# Patient Record
Sex: FEMALE | Race: WHITE | Employment: FULL TIME | URBAN - NONMETROPOLITAN AREA
[De-identification: names, ages, dates, MRNs, and addresses within clinical notes are randomized per-mention and may not be internally consistent; named-entity substitution may affect disease eponyms.]

---

## 2020-11-12 ENCOUNTER — VIRTUAL VISIT (OUTPATIENT)
Dept: FAMILY MEDICINE CLINIC | Age: 60
End: 2020-11-12
Payer: COMMERCIAL

## 2020-11-12 DIAGNOSIS — Z00.00 VISIT FOR ANNUAL HEALTH EXAMINATION: ICD-10-CM

## 2020-11-12 DIAGNOSIS — R63.5 UNINTENDED WEIGHT GAIN: ICD-10-CM

## 2020-11-12 DIAGNOSIS — E55.9 VITAMIN D DEFICIENCY: ICD-10-CM

## 2020-11-12 DIAGNOSIS — N95.1 HOT FLASHES DUE TO MENOPAUSE: ICD-10-CM

## 2020-11-12 DIAGNOSIS — F41.1 GAD (GENERALIZED ANXIETY DISORDER): Primary | ICD-10-CM

## 2020-11-12 DIAGNOSIS — K52.29: ICD-10-CM

## 2020-11-12 PROCEDURE — 99443 PR PHYS/QHP TELEPHONE EVALUATION 21-30 MIN: CPT | Performed by: NURSE PRACTITIONER

## 2020-11-12 RX ORDER — PAROXETINE HYDROCHLORIDE 20 MG/1
1 TABLET, FILM COATED ORAL DAILY
COMMUNITY
End: 2020-11-12 | Stop reason: SDUPTHER

## 2020-11-12 RX ORDER — PAROXETINE HYDROCHLORIDE 20 MG/1
20 TABLET, FILM COATED ORAL DAILY
Qty: 90 TAB | Refills: 3 | Status: SHIPPED | OUTPATIENT
Start: 2020-11-12 | End: 2021-12-09 | Stop reason: SDUPTHER

## 2020-11-12 NOTE — PROGRESS NOTES
Kerry Garcia presents today for   Chief Complaint   Patient presents with    Medication Refill       Depression Screening:  3 most recent PHQ Screens 11/12/2020   Little interest or pleasure in doing things Not at all   Feeling down, depressed, irritable, or hopeless Not at all   Total Score PHQ 2 0       Learning Assessment:  Learning Assessment 11/12/2020   PRIMARY LEARNER Patient   HIGHEST LEVEL OF EDUCATION - PRIMARY LEARNER  SOME COLLEGE   BARRIERS PRIMARY LEARNER NONE   PRIMARY LANGUAGE ENGLISH   LEARNER PREFERENCE PRIMARY READING   ANSWERED BY patient   RELATIONSHIP SELF       Health Maintenance reviewed and discussed and ordered per Provider. Health Maintenance Due   Topic Date Due    Hepatitis C Screening  1960    DTaP/Tdap/Td series (1 - Tdap) 08/01/1983    PAP AKA CERVICAL CYTOLOGY  08/01/1983    Lipid Screen  08/01/2002    Shingrix Vaccine Age 50> (1 of 2) 08/01/2012    Colorectal Cancer Screening Combo  08/01/2012    Breast Cancer Screen Mammogram  08/01/2012    Flu Vaccine (1) 09/01/2020   . Coordination of Care:  1. Have you been to the ER, urgent care clinic since your last visit? Hospitalized since your last visit? No    2. Have you seen or consulted any other health care providers outside of the [address] [place] [address] since your last visit? Include any pap smears or colon screening.  No

## 2020-11-12 NOTE — PROGRESS NOTES
I am seeing this patient today virtually using HIPAA-compliant video-conferencing technology due to the COVID-19 Pandemic. The patient has previously provided full consent to use this technology and understands the risks and benefits of proceeding. I am seeing the patient today from my office in [place], [place]. The patient is in his/her home located within [place]. Patient already made aware that this is a virtual visit with provider and that for the purposes of billing, we will submit a claim for reimbursement with your insurance company. He/She was informed that he/she will be responsible for any copays, coinsurance amounts or other amounts not covered by his/her insurance company. Patient consented and accepted terms of virtual visit. THIS VISIT WAS CONDUCTED OVER THE TELEPHONE    HPI    Cole Lou is a 64 y.o. female and presents today for Medication Refill  She reports a history of IBS versus allergic gastroenteritis versus anxiety disorder. She has seen several specialists for her IBS type symptoms but no one can figure out why she has bouts of acute abdominal pain/cramping with diarrhea/vomiting. She states that she was started on paroxetine for this and reports that it has helped to decrease the frequency of episodes. she takes no other medication. She has no other chronic diseases. Allergies    No Known Allergies     Medications    Current Outpatient Medications   Medication Sig Dispense    PARoxetine (PAXIL) 20 mg tablet Take 1 Tab by mouth daily. 90 Tab     No current facility-administered medications for this visit.          Screening  Current dx    Health Maintenance    Health Maintenance Due   Topic Date Due    Hepatitis C Screening  1960    DTaP/Tdap/Td series (1 - Tdap) 08/01/1983    PAP AKA CERVICAL CYTOLOGY  08/01/1983    Lipid Screen  08/01/2002    Shingrix Vaccine Age 50> (1 of 2) 08/01/2012    Colorectal Cancer Screening Combo  08/01/2012    Breast Cancer Screen Mammogram  08/01/2012    Flu Vaccine (1) 09/01/2020        Problem List    Patient Active Problem List    Diagnosis Date Noted    MARCY (generalized anxiety disorder) 11/12/2020    Allergic gastroenteritis 11/12/2020        Family Hx    Family History   Problem Relation Age of Onset    Cancer Father         Social Hx    Social History     Socioeconomic History    Marital status:      Spouse name: Not on file    Number of children: Not on file    Years of education: Not on file    Highest education level: Not on file   Tobacco Use    Smoking status: Never Smoker    Smokeless tobacco: Never Used   Substance and Sexual Activity    Alcohol use: Yes     Comment: few times a week    Drug use: Never        Surgical Hx    Past Surgical History:   Procedure Laterality Date    HX CHOLECYSTECTOMY      HX HYSTERECTOMY          Vitals    There were no vitals taken for this visit. Patient-Reported Vitals 11/12/2020   Patient-Reported Weight 159        ROS    Review of Systems   Constitutional: Negative for chills, fever and malaise/fatigue. HENT: Negative for congestion, ear pain, sinus pain and sore throat. Eyes: Negative for blurred vision and redness. Respiratory: Negative for cough, sputum production, shortness of breath and wheezing. Cardiovascular: Negative for chest pain, palpitations and leg swelling. Gastrointestinal: Negative for abdominal pain, constipation, diarrhea, heartburn, nausea and vomiting. Genitourinary: Negative for dysuria and hematuria. Musculoskeletal: Negative for falls, joint pain and myalgias. Skin: Negative for rash. Neurological: Negative for dizziness, seizures, weakness and headaches. Endo/Heme/Allergies: Does not bruise/bleed easily. Psychiatric/Behavioral: Negative for depression and suicidal ideas. The patient does not have insomnia.          Physical Exam    Patient is a 64y.o. year old female     Physical exam was deferred due to [address] precautions as visit was completed via telemedicine. Assessment/Plan  1. MARCY (generalized anxiety disorder)  Stable on paxil; cont current regimen; Patient to continue current medication regimen and was advised to call 911 if there are any developments of suicidal/homicidal ideation. Patient verbalized understanding.   - PARoxetine (PAXIL) 20 mg tablet; Take 1 Tab by mouth daily. Dispense: 90 Tab; Refill: 3    2. Allergic gastroenteritis  Has been on Paxil for this for some time; it's working well; cont current regimen  - PARoxetine (PAXIL) 20 mg tablet; Take 1 Tab by mouth daily. Dispense: 90 Tab; Refill: 3    3. Unintended weight gain  Will check labs; thinks it could be due to slowing down since pandemic  - METABOLIC PANEL, COMPREHENSIVE; Future  - TSH 3RD GENERATION; Future  - HEMOGLOBIN A1C WITH EAG; Future    4. Vitamin D deficiency  Concerned for deficiency; will check labs  - VITAMIN D, 25 HYDROXY; Future    5. Visit for annual health examination  Yearly appointment for patient and she is overdue for labs  - CBC W/O DIFF; Future  - METABOLIC PANEL, COMPREHENSIVE; Future  - TSH 3RD GENERATION; Future  - VITAMIN D, 25 HYDROXY; Future  - LIPID PANEL; Future  - HEMOGLOBIN A1C WITH EAG; Future    6. Hot flashes due to menopause  Cont current regimen as it's working well  - PARoxetine (PAXIL) 20 mg tablet; Take 1 Tab by mouth daily. Dispense: 90 Tab; Refill: 3         Health Maintenance Items reviewed with patient as noted. 21 minutes spent with patient/reviewing records during virtual appt discussing diagnoses, treatment options, and answering any patient questions.     Emi Torres, MSN, FNP-BC

## 2020-11-24 ENCOUNTER — HOSPITAL ENCOUNTER (OUTPATIENT)
Dept: LAB | Age: 60
Discharge: HOME | End: 2020-11-24
Payer: COMMERCIAL

## 2020-11-24 LAB
ALBUMIN SERPL-MCNC: 3.8 G/DL (ref 3.5–4.7)
ALBUMIN/GLOB SERPL: 1.3 {RATIO}
ALP SERPL-CCNC: 77 U/L (ref 38–126)
ALT SERPL-CCNC: 16 U/L (ref 3–52)
ANION GAP SERPL CALC-SCNC: 10 MMOL/L
AST SERPL W P-5'-P-CCNC: 21 U/L (ref 14–74)
BASOPHILS # BLD: 0.1 K/UL (ref 0–0.1)
BASOPHILS NFR BLD: 1 % (ref 0–2)
BILIRUB SERPL-MCNC: 0.8 MG/DL (ref 0.2–1)
BUN SERPL-MCNC: 11 MG/DL (ref 9–21)
BUN/CREAT SERPL: 11
CA-I BLD-MCNC: 8.9 MG/DL (ref 8.5–10.5)
CHLORIDE SERPL-SCNC: 105 MMOL/L (ref 94–111)
CO2 SERPL-SCNC: 25 MMOL/L (ref 21–33)
CREAT SERPL-MCNC: 1 MG/DL (ref 0.7–1.2)
EOSINOPHIL # BLD: 0.3 K/UL (ref 0–0.4)
EOSINOPHIL NFR BLD: 4 % (ref 0–5)
ERYTHROCYTE [DISTWIDTH] IN BLOOD BY AUTOMATED COUNT: 12.5 % (ref 11.6–14.5)
GLOBULIN SER CALC-MCNC: 3 G/DL
GLUCOSE SERPL-MCNC: 77 MG/DL (ref 70–110)
HCT VFR BLD AUTO: 43.1 % (ref 35–45)
HGB BLD-MCNC: 13.8 G/DL (ref 12–16)
IMM GRANULOCYTES # BLD AUTO: 0 K/UL
IMM GRANULOCYTES NFR BLD AUTO: 1 %
LYMPHOCYTES # BLD: 1.6 K/UL (ref 0.9–3.6)
LYMPHOCYTES NFR BLD: 24 % (ref 21–52)
MCH RBC QN AUTO: 29.6 PG (ref 24–34)
MCHC RBC AUTO-ENTMCNC: 32 G/DL (ref 31–37)
MCV RBC AUTO: 92.3 FL (ref 74–97)
MONOCYTES # BLD: 0.4 K/UL (ref 0.05–1.2)
MONOCYTES NFR BLD: 6 % (ref 3–10)
NEUTS SEG # BLD: 4.2 K/UL (ref 1.8–8)
NEUTS SEG NFR BLD: 64 % (ref 40–73)
PLATELET # BLD AUTO: 319 K/UL (ref 135–420)
PMV BLD AUTO: 9.3 FL (ref 9.2–11.8)
POTASSIUM SERPL-SCNC: 3.8 MMOL/L (ref 3.2–5.1)
PROT SERPL-MCNC: 6.8 G/DL (ref 6.1–8.4)
RBC # BLD AUTO: 4.67 M/UL (ref 4.2–5.3)
SODIUM SERPL-SCNC: 140 MMOL/L (ref 135–145)
TSH SERPL DL<=0.05 MIU/L-ACNC: 2.07 UIU/ML (ref 0.35–6.2)
WBC # BLD AUTO: 6.5 K/UL (ref 4.6–13.2)

## 2020-11-24 PROCEDURE — 36415 COLL VENOUS BLD VENIPUNCTURE: CPT

## 2020-11-24 PROCEDURE — 84443 ASSAY THYROID STIM HORMONE: CPT

## 2020-11-24 PROCEDURE — 83036 HEMOGLOBIN GLYCOSYLATED A1C: CPT

## 2020-11-24 PROCEDURE — 80061 LIPID PANEL: CPT

## 2020-11-24 PROCEDURE — 85025 COMPLETE CBC W/AUTO DIFF WBC: CPT

## 2020-11-24 PROCEDURE — 82306 VITAMIN D 25 HYDROXY: CPT

## 2020-11-24 PROCEDURE — 80053 COMPREHEN METABOLIC PANEL: CPT

## 2020-11-25 LAB
25(OH)D3 SERPL-MCNC: 18.5 NG/ML (ref 30–100)
CHOLEST SERPL-MCNC: 187 MG/DL
EST. AVERAGE GLUCOSE BLD GHB EST-MCNC: 100 MG/DL
HBA1C MFR BLD: 5.1 % (ref 4–5.6)
HDLC SERPL-MCNC: 104 MG/DL
HDLC SERPL: 1.8 {RATIO} (ref 0–5)
LDLC SERPL CALC-MCNC: 69 MG/DL (ref 0–100)
LIPID PROFILE,FLP: NORMAL
TRIGL SERPL-MCNC: 70 MG/DL (ref ?–150)
VLDLC SERPL CALC-MCNC: 14 MG/DL

## 2020-11-30 DIAGNOSIS — E55.9 VITAMIN D DEFICIENCY: Primary | ICD-10-CM

## 2020-11-30 RX ORDER — ERGOCALCIFEROL 1.25 MG/1
50000 CAPSULE ORAL
Qty: 12 CAP | Refills: 3 | Status: SHIPPED | OUTPATIENT
Start: 2020-11-30 | End: 2021-05-11 | Stop reason: SDUPTHER

## 2021-01-22 ENCOUNTER — OFFICE VISIT (OUTPATIENT)
Dept: FAMILY MEDICINE CLINIC | Age: 61
End: 2021-01-22
Payer: COMMERCIAL

## 2021-01-22 VITALS
OXYGEN SATURATION: 96 % | HEART RATE: 85 BPM | DIASTOLIC BLOOD PRESSURE: 72 MMHG | TEMPERATURE: 98.6 F | SYSTOLIC BLOOD PRESSURE: 108 MMHG

## 2021-01-22 DIAGNOSIS — H10.9 CONJUNCTIVITIS OF BOTH EYES, UNSPECIFIED CONJUNCTIVITIS TYPE: ICD-10-CM

## 2021-01-22 DIAGNOSIS — Z20.822 SUSPECTED COVID-19 VIRUS INFECTION: ICD-10-CM

## 2021-01-22 DIAGNOSIS — B02.9 HERPES ZOSTER WITHOUT COMPLICATION: Primary | ICD-10-CM

## 2021-01-22 PROCEDURE — 99214 OFFICE O/P EST MOD 30 MIN: CPT | Performed by: INTERNAL MEDICINE

## 2021-01-22 RX ORDER — ACYCLOVIR 800 MG/1
800 TABLET ORAL
Qty: 40 TAB | Refills: 0 | Status: SHIPPED | OUTPATIENT
Start: 2021-01-22 | End: 2021-01-30

## 2021-01-22 NOTE — PROGRESS NOTES
Viral infection, skin irritation, possible shingles, night sweats. Covid tested        Charlie Flores presents today for   Chief Complaint   Patient presents with    Shingles       Is someone accompanying this pt? No      Is the patient using any DME equipment during OV? no    Depression Screening:  3 most recent PHQ Screens 11/12/2020   Little interest or pleasure in doing things Not at all   Feeling down, depressed, irritable, or hopeless Not at all   Total Score PHQ 2 0       Learning Assessment:  Learning Assessment 11/12/2020   PRIMARY LEARNER Patient   HIGHEST LEVEL OF EDUCATION - PRIMARY LEARNER  SOME COLLEGE   BARRIERS PRIMARY LEARNER NONE   PRIMARY LANGUAGE ENGLISH   LEARNER PREFERENCE PRIMARY READING   ANSWERED BY patient   RELATIONSHIP SELF         Health Maintenance reviewed and discussed and ordered per Provider. Health Maintenance Due   Topic Date Due    Hepatitis C Screening  1960    DTaP/Tdap/Td series (1 - Tdap) 08/01/1983    PAP AKA CERVICAL CYTOLOGY  08/01/1983    Shingrix Vaccine Age 50> (1 of 2) 08/01/2012    Colorectal Cancer Screening Combo  08/01/2012    Flu Vaccine (1) 09/01/2020   . Coordination of Care:  1. Have you been to the ER, urgent care clinic since your last visit? Hospitalized since your last visit? no    2. Have you seen or consulted any other health care providers outside of the [address] [place] [address] since your last visit? Include any pap smears or colon screening.  no

## 2021-01-22 NOTE — PROGRESS NOTES
This is a very pleasant patient who was seen in clinic today for an acute visit. Assessment/Plan:      1. Herpes zoster without complication  Clearly has what appears to be zoster. They have resolved on her right chest but she now has it on her back. Alternatively this could be a viral exanthem having pain and tingling though I am leaning towards herpes zoster. I am going to check a CBC and a CMP start acyclovir 805 times daily  - acyclovir (ZOVIRAX) 800 mg tablet; Take 1 Tab by mouth five (5) times daily for 8 days. Dispense: 40 Tab; Refill: 0  - METABOLIC PANEL, COMPREHENSIVE  - CBC WITH AUTOMATED DIFF    2. Suspected COVID-19 virus infection  Has direct exposures to COVID-19 patients. Additionally she has postnasal drip she has a couple ulcers in her throat and headache some chills and what may be a viral exanthem. I am going to check her for COVID-19 and she is being quarantined. She is to report to the ER should she develop any shortness of breath or chest pain  - NOVEL CORONAVIRUS (COVID-19); Future    3. Conjunctivitis of both eyes, unspecified conjunctivitis type  Clearly has some conjunctivitis but it almost appears she has icterus also on the periphery of her eyes. I am going to check a CMP  - NOVEL CORONAVIRUS (COVID-19); Future  - METABOLIC PANEL, COMPREHENSIVE  - CBC WITH AUTOMATED DIFF       Reviewed with him that COVID-19 pandemic is an evolving situation with rapidly changing recommendations & guidelines. Medical decisions are made based on the the best information available at the time. Recommended he stay tuned for updates published by trusted sources and to advise your PCP of any unexpected changes in clinical condition       Chief Complaint   Patient presents with    Shingles    Concern For COVID-19 (Coronavirus)      Subjective:   HPI     This is a pleasant 82-year-old female who presents today complaining of postnasal drip sore throat eye redness and pain on her skin.   She noticed a Leet set of lesions on her right chest several days ago which have resolved. Unfortunately now she is noted that on her back specifically her upper back at feet feels like it is tingling and burning. It reminds her of when she had shingles. She also notes that she is just felt very tired and fatigued. She was checked for COVID-19 about a week and a half ago was negative. She is coming contact with people who have Covid. She reports some mild postnasal drip and a sore throat. She also reports a slight headache. She has no nausea vomiting or diarrhea. She denies any definitive fever but she has been having chills. Recent Travel Screening and Travel History documentation  Exam      ROS  - GEN: no weight gain/loss, no fevers + chills  - HEENT: no vision changes, no tinnitus, + sore throat  - CV: no cp, palpitations or edema  - RESP: no sob, + dry cough  - ABD: no n/v/d, no blood in stool  - : no dysuria or changes in freq. - SKIN: lesion on back, tingling/buring on her back  - Neuro: no resting tremors, parasthesia in extremities, no headaches  - MS: No weakness in extremities, no gait abnormalities  - Other    Visit Vitals  /72   Pulse 85   Temp 98.6 °F (37 °C)   SpO2 96%        Physical Exam  Constitutional:       Appearance: Normal appearance. Verónica Sharper NAD and pleasant  HENT:      Head: Normocephalic. Nose: Nose normal.      Mouth/Throat:      Mouth: Mucous membranes are moist. Throat not inflammed but two small ulcers are presernt  Eyes:      Extraocular Movements: Extraocular movements intact. Conjunctiva/sclera: Conjunctivae erythemetous ? Icterus at periphery? Pupils: Pupils are equal, round, and reactive to light. Cardiovascular:      Rate and Rhythm: Normal rate and regular rhythm. Pulses: Normal pulses. Pulmonary:      Effort: No respiratory distress. Breath sounds: CTAB and No stridor. No rhonchi. Abdominal:      General: There is no distension.  NT, ND  Neurological:      Mental Status: She is alert and oriented times 3. No resting tremor, normal gait     Cranial Nerves: cranial nerves grossly intact  SKIN: multiple areas on back with a maculopapular appearings. No open wounds. Pain noted when I rub my finger over the lesions    Current Outpatient Medications on File Prior to Visit   Medication Sig Dispense Refill    ergocalciferol (ERGOCALCIFEROL) 1,250 mcg (50,000 unit) capsule Take 1 Cap by mouth every seven (7) days. 12 Cap 3    PARoxetine (PAXIL) 20 mg tablet Take 1 Tab by mouth daily. 90 Tab 3     No current facility-administered medications on file prior to visit. Disclaimer:  Discussed expected course/resolution/complications of diagnosis in detail with patient. Medication risks/benefits/costs/interactions/alternatives discussed with patient. He was given an after visit summary which includes diagnoses, current medications, & vitals. He expressed understanding with the diagnosis and plan.

## 2021-01-24 LAB — SARS-COV-2, NAA: NOT DETECTED

## 2021-05-11 ENCOUNTER — VIRTUAL VISIT (OUTPATIENT)
Dept: FAMILY MEDICINE CLINIC | Age: 61
End: 2021-05-11
Payer: COMMERCIAL

## 2021-05-11 ENCOUNTER — TELEPHONE (OUTPATIENT)
Dept: FAMILY MEDICINE CLINIC | Age: 61
End: 2021-05-11

## 2021-05-11 DIAGNOSIS — R30.0 DYSURIA: Primary | ICD-10-CM

## 2021-05-11 DIAGNOSIS — E55.9 VITAMIN D DEFICIENCY: ICD-10-CM

## 2021-05-11 DIAGNOSIS — N30.90 CYSTITIS: ICD-10-CM

## 2021-05-11 PROCEDURE — 99213 OFFICE O/P EST LOW 20 MIN: CPT | Performed by: INTERNAL MEDICINE

## 2021-05-11 RX ORDER — ERGOCALCIFEROL 1.25 MG/1
50000 CAPSULE ORAL
Qty: 12 CAP | Refills: 3 | Status: SHIPPED | OUTPATIENT
Start: 2021-05-11 | End: 2021-12-09 | Stop reason: ALTCHOICE

## 2021-05-11 RX ORDER — CEFUROXIME AXETIL 500 MG/1
500 TABLET ORAL 2 TIMES DAILY
Qty: 14 TAB | Refills: 0 | Status: SHIPPED | OUTPATIENT
Start: 2021-05-11 | End: 2021-12-09 | Stop reason: ALTCHOICE

## 2021-05-11 NOTE — PROGRESS NOTES
Preeti Montero presents today for   Chief Complaint   Patient presents with    UTI     possible uti symptoms       Depression Screening:  3 most recent PHQ Screens 5/11/2021   Little interest or pleasure in doing things Not at all   Feeling down, depressed, irritable, or hopeless Not at all   Total Score PHQ 2 0       Learning Assessment:  Learning Assessment 11/12/2020   PRIMARY LEARNER Patient   HIGHEST LEVEL OF EDUCATION - PRIMARY LEARNER  SOME COLLEGE   BARRIERS PRIMARY LEARNER NONE   PRIMARY LANGUAGE ENGLISH   LEARNER PREFERENCE PRIMARY READING   ANSWERED BY patient   RELATIONSHIP SELF       Health Maintenance reviewed and discussed and ordered per Provider. Health Maintenance Due   Topic Date Due    Hepatitis C Screening  Never done    COVID-19 Vaccine (1) Never done    DTaP/Tdap/Td series (1 - Tdap) Never done    PAP AKA CERVICAL CYTOLOGY  Never done    Shingrix Vaccine Age 50> (1 of 2) Never done    Colorectal Cancer Screening Combo  Never done   . Coordination of Care:  1. Have you been to the ER, urgent care clinic since your last visit? Hospitalized since your last visit? no    2. Have you seen or consulted any other health care providers outside of the [address] [place] [address] since your last visit? Include any pap smears or colon screening.  No    Height-5'8

## 2021-05-11 NOTE — PROGRESS NOTES
I was at my office in [place], [place] while conducting this encounter. This virtual visit was conducted via Telephone    1. Dysuria  See below    2. Vitamin D deficiency  Renew vitamin D  - ergocalciferol (ERGOCALCIFEROL) 1,250 mcg (50,000 unit) capsule; Take 1 Cap by mouth every seven (7) days. Dispense: 12 Cap; Refill: 3    3. Cystitis  Start Ceftin 500 twice daily for 7 days  - cefUROXime (CEFTIN) 500 mg tablet; Take 1 Tab by mouth two (2) times a day. Dispense: 14 Tab; Refill: 0       Chief Complaint   Patient presents with    UTI     possible uti symptoms        HPI   This is a delightful 70-year-old female who presents today complaining of roughly 2-1/2 days of increased urinary frequency and pressure. Just prior to this she had been eating at [facility] and unfortunately it upset her stomach horrible and she had very profuse diarrhea. At that time she pretty much decided she was going to get a urinary tract infection. Over the following day or so she began to notice increased pressure. She has no fevers or chills she has no flank pain she has no nausea or vomiting. Otherwise she reports she is been doing well  Current Outpatient Medications on File Prior to Visit   Medication Sig Dispense Refill    PARoxetine (PAXIL) 20 mg tablet Take 1 Tab by mouth daily. 90 Tab 3     No current facility-administered medications on file prior to visit.          Patient Active Problem List   Diagnosis Code    MARCY (generalized anxiety disorder) F41.1    Allergic gastroenteritis K52.29        Gen: nad, pleasant  heent mmm  Cv:\" rrr  abd bs+, NT

## 2021-12-09 ENCOUNTER — OFFICE VISIT (OUTPATIENT)
Dept: INTERNAL MEDICINE CLINIC | Age: 61
End: 2021-12-09
Payer: COMMERCIAL

## 2021-12-09 VITALS
TEMPERATURE: 97 F | RESPIRATION RATE: 18 BRPM | SYSTOLIC BLOOD PRESSURE: 132 MMHG | OXYGEN SATURATION: 99 % | WEIGHT: 166.4 LBS | HEIGHT: 68 IN | HEART RATE: 65 BPM | BODY MASS INDEX: 25.22 KG/M2 | DIASTOLIC BLOOD PRESSURE: 86 MMHG

## 2021-12-09 DIAGNOSIS — Z01.419 PAP TEST, AS PART OF ROUTINE GYNECOLOGICAL EXAMINATION: ICD-10-CM

## 2021-12-09 DIAGNOSIS — K52.29: ICD-10-CM

## 2021-12-09 DIAGNOSIS — F41.1 GAD (GENERALIZED ANXIETY DISORDER): Primary | ICD-10-CM

## 2021-12-09 DIAGNOSIS — Z12.11 ENCOUNTER FOR SCREENING COLONOSCOPY: ICD-10-CM

## 2021-12-09 DIAGNOSIS — N95.1 HOT FLASHES DUE TO MENOPAUSE: ICD-10-CM

## 2021-12-09 PROCEDURE — 99213 OFFICE O/P EST LOW 20 MIN: CPT | Performed by: INTERNAL MEDICINE

## 2021-12-09 RX ORDER — PAROXETINE HYDROCHLORIDE 20 MG/1
20 TABLET, FILM COATED ORAL DAILY
Qty: 90 TABLET | Refills: 3 | Status: SHIPPED | OUTPATIENT
Start: 2021-12-09

## 2021-12-09 NOTE — PROGRESS NOTES
Manuel Pacheco presents today for   Chief Complaint   Patient presents with    Follow-up    Medication Refill       Is someone accompanying this pt? no  Is the patient using any DME equipment during OV? no    Depression Screening:  3 most recent PHQ Screens 12/9/2021   Little interest or pleasure in doing things Not at all   Feeling down, depressed, irritable, or hopeless Not at all   Total Score PHQ 2 0       Learning Assessment:  Learning Assessment 11/12/2020   PRIMARY LEARNER Patient   HIGHEST LEVEL OF EDUCATION - PRIMARY LEARNER  SOME COLLEGE   BARRIERS PRIMARY LEARNER NONE   PRIMARY LANGUAGE ENGLISH   LEARNER PREFERENCE PRIMARY READING   ANSWERED BY patient   RELATIONSHIP SELF         Health Maintenance reviewed and discussed and ordered per Provider. Health Maintenance Due   Topic Date Due    Hepatitis C Screening  Never done    DTaP/Tdap/Td series (1 - Tdap) Never done    Cervical cancer screen  Never done    Colorectal Cancer Screening Combo  Never done    Shingrix Vaccine Age 50> (1 of 2) Never done    Flu Vaccine (1) Never done   . Coordination of Care:  1. \"Have you been to the ER, urgent care clinic since your last visit? Hospitalized since your last visit? \" yes [facility] kidney stone    2. \"Have you seen or consulted any other health care providers outside of the [address] [place] [address] since your last visit? \" No     3. For patients aged 39-70: Has the patient had a colonoscopy? No     If the patient is female:    4. For patients aged 41-77: Has the patient had a mammogram within the past 2 years? Yes,  satisfied with blue hyperlink 2021    5. For patients aged 21-65: Has the patient had a pap smear?  No

## 2021-12-09 NOTE — PROGRESS NOTES
1. MARCY (generalized anxiety disorder)  But her anxiety has been well controlled. I am going to renew her Paxil  - PARoxetine (PAXIL) 20 mg tablet; Take 1 Tablet by mouth daily. Dispense: 90 Tablet; Refill: 3    2. Hot flashes due to menopause  Well controlled with Paxil  - PARoxetine (PAXIL) 20 mg tablet; Take 1 Tablet by mouth daily. Dispense: 90 Tablet; Refill: 3    4. Encounter for screening colonoscopy  Opal Guerrero is due for a colonoscopy and I am referring her to GI  - REFERRAL TO GASTROENTEROLOGY    5. Pap test, as part of routine gynecological examination  The patient has asked referral to a different OB/GYN. She does have a history of uterine cancer. I making that right recommendation  - REFERRAL TO OBSTETRICS AND GYNECOLOGY    Results for Tim Leigh (MRN [id number]) as of 12/9/2021 16:07   Ref. Range 11/24/2020 08:48   Sodium Latest Ref Range: 135 - 145 mmol/L 140   Potassium Latest Ref Range: 3.2 - 5.1 mmol/L 3.8   Chloride Latest Ref Range: 94 - 111 mmol/L 105   CO2 Latest Ref Range: 21 - 33 mmol/L 25   Anion gap Latest Units: mmol/L 10   Glucose Latest Ref Range: 70 - 110 mg/dL 77   BUN Latest Ref Range: 9 - 21 mg/dL 11   Creatinine Latest Ref Range: 0.70 - 1.20 mg/dL 1.00   BUN/Creatinine ratio Latest Units:   11   Calcium Latest Ref Range: 8.5 - 10.5 mg/dL 8.9   GFR est non-AA Latest Units: ml/min/1.73m2 57   GFR est AA Latest Units: ml/min/1.73m2 >60   Bilirubin, total Latest Ref Range: 0.2 - 1.0 mg/dL 0.8   Protein, total Latest Ref Range: 6.1 - 8.4 g/dL 6.8   Albumin Latest Ref Range: 3.5 - 4.7 g/dL 3.8   Globulin Latest Units: g/dL 3.0   A-G Ratio Latest Units:   1.3   ALT Latest Ref Range: 3 - 52 U/L 16   AST Latest Ref Range: 14 - 74 U/L 21   Alk.  phosphatase Latest Ref Range: 38 - 126 U/L 77   Triglyceride Latest Ref Range: <150 mg/dL 70   Cholesterol, total Latest Ref Range: <200 mg/dL 187   HDL Cholesterol Latest Units: mg/dL 104   CHOL/HDL Ratio Latest Ref Range: 0.0 - 5.0   1.8 VLDL, calculated Latest Units: mg/dL 14   LDL, calculated Latest Ref Range: 0 - 100 mg/dL 69   Hemoglobin A1c, (calculated) Latest Ref Range: 4.0 - 5.6 % 5.1   Est. average glucose Latest Units: mg/dL 100     Chief Complaint   Patient presents with    Follow-up    Medication Refill        HPI   This is a delightful 31-year-old female with a history of hot flashes and generalized anxiety disorder who presents for follow-up. She reports she was at [facility] due to an acute kidney stone but subsequently improved. I have reviewed her labs from September. She reports overall she is doing very well. She reports her anxiety while still there is relatively under control. Her hot flashes are also at baseline. She has no other complaints today  Patient Active Problem List   Diagnosis Code    MARCY (generalized anxiety disorder) F41.1    Allergic gastroenteritis K52.29        No current outpatient medications on file prior to visit. No current facility-administered medications on file prior to visit. ROS  - GEN: no weight gain/loss, no fevers or chills  - HEENT: no vision changes, no tinnitus, no sore throat  - CV: no cp, palpitations or edema  - RESP: no sob, cough  -   Visit Vitals  /86   Pulse 65   Temp 97 °F (36.1 °C)   Resp 18   Ht 5' 8\" (1.727 m)   Wt 166 lb 6.4 oz (75.5 kg)   SpO2 99%   BMI 25.30 kg/m²           Physical Exam  Constitutional:       Appearance: Normal appearance. Normal weight. NAD and pleasant  HENT:      Head: Normocephalic. Nose: Nose normal.      Mouth/Throat:     Eyes:      Extraocular Movements: Extraocular movements intact. Conjunctiva/sclera: Conjunctivae normal. Sclera anicteric     Cardiovascular:      Rate and Rhythm: Normal rate and regular rhythm. Pulses: Normal pulses. Pulmonary:      Effort: No respiratory distress. Breath sounds: CTAB and No stridor. No rhonchi.

## 2022-03-19 PROBLEM — K52.29 ALLERGIC GASTROENTERITIS: Status: ACTIVE | Noted: 2020-11-12

## 2022-03-20 PROBLEM — F41.1 GAD (GENERALIZED ANXIETY DISORDER): Status: ACTIVE | Noted: 2020-11-12

## 2022-11-21 ENCOUNTER — TELEPHONE (OUTPATIENT)
Dept: FAMILY MEDICINE CLINIC | Age: 62
End: 2022-11-21

## 2022-11-21 NOTE — TELEPHONE ENCOUNTER
Referred to Donovan Walker. Krista Gaytan NP  ----- Message from Maggie Magallon sent at 11/21/2022  4:03 PM EST -----  Subject: Appointment Request    Reason for Call: New Patient/New to Provider Appointment needed: New   Patient Request Appointment    QUESTIONS    Reason for appointment request? No appointments available during search     Additional Information for Provider? Pt needs a new pcp. Was Dr Chaim Terrell   pt. Pleased contact the pt with an appt time to be seen.   ---------------------------------------------------------------------------  --------------  Contreras Cruz IIPT  [phone]; OK to leave message on voicemail  ---------------------------------------------------------------------------  --------------  SCRIPT ANSWERS  GERID Screen: Elliot Otto

## 2022-12-01 ENCOUNTER — OFFICE VISIT (OUTPATIENT)
Dept: FAMILY MEDICINE CLINIC | Age: 62
End: 2022-12-01
Payer: COMMERCIAL

## 2022-12-01 VITALS
SYSTOLIC BLOOD PRESSURE: 121 MMHG | DIASTOLIC BLOOD PRESSURE: 83 MMHG | OXYGEN SATURATION: 95 % | HEART RATE: 74 BPM | TEMPERATURE: 97.4 F | RESPIRATION RATE: 16 BRPM | BODY MASS INDEX: 25.91 KG/M2 | WEIGHT: 170.4 LBS

## 2022-12-01 DIAGNOSIS — N95.1 HOT FLASHES DUE TO MENOPAUSE: ICD-10-CM

## 2022-12-01 DIAGNOSIS — F41.1 GAD (GENERALIZED ANXIETY DISORDER): ICD-10-CM

## 2022-12-01 DIAGNOSIS — R53.83 FATIGUE, UNSPECIFIED TYPE: ICD-10-CM

## 2022-12-01 DIAGNOSIS — K52.29: ICD-10-CM

## 2022-12-01 DIAGNOSIS — Z12.31 ENCOUNTER FOR SCREENING MAMMOGRAM FOR MALIGNANT NEOPLASM OF BREAST: ICD-10-CM

## 2022-12-01 DIAGNOSIS — Z13.21 ENCOUNTER FOR VITAMIN DEFICIENCY SCREENING: Primary | ICD-10-CM

## 2022-12-01 RX ORDER — PAROXETINE HYDROCHLORIDE 20 MG/1
20 TABLET, FILM COATED ORAL DAILY
Qty: 90 TABLET | Refills: 3 | Status: SHIPPED | OUTPATIENT
Start: 2022-12-01

## 2022-12-01 NOTE — PROGRESS NOTES
Lulyjohn Coe presents today for   Chief Complaint   Patient presents with    Establish Care     Establish care with provider has not seen a dr in a while and needs some overall blood work       Is someone accompanying this pt? No     Is the patient using any DME equipment during OV? No     Depression Screening:  3 most recent PHQ Screens 12/1/2022   Little interest or pleasure in doing things Not at all   Feeling down, depressed, irritable, or hopeless Not at all   Total Score PHQ 2 0       Learning Assessment:  Learning Assessment 11/12/2020   PRIMARY LEARNER Patient   HIGHEST LEVEL OF EDUCATION - PRIMARY LEARNER  SOME COLLEGE   BARRIERS PRIMARY LEARNER NONE   PRIMARY LANGUAGE ENGLISH   LEARNER PREFERENCE PRIMARY READING   ANSWERED BY patient   RELATIONSHIP SELF       Fall Risk  Fall Risk Assessment, last 12 mths 11/12/2020   Able to walk? Yes   Fall in past 12 months? No       ADL  ADL Assessment 12/1/2022   Feeding yourself No Help Needed   Getting from bed to chair No Help Needed   Getting dressed No Help Needed   Bathing or showering No Help Needed   Walk across the room (includes cane/walker) No Help Needed   Using the telphone No Help Needed   Taking your medications No Help Needed   Preparing meals No Help Needed   Managing money (expenses/bills) No Help Needed   Moderately strenuous housework (laundry) No Help Needed   Shopping for personal items (toiletries/medicines) No Help Needed   Shopping for groceries No Help Needed   Driving No Help Needed   Climbing a flight of stairs No Help Needed   Getting to places beyond walking distances No Help Needed       Travel Screening:    Travel Screening       Question Response    In the last 10 days, have you been in contact with someone who was confirmed or suspected to have Coronavirus/COVID-19? No / Unsure    Have you had a COVID-19 viral test in the last 10 days? No    Do you have any of the following new or worsening symptoms?  None of these    Have you traveled internationally or domestically in the last month? No          Travel History   Travel since 11/01/22    No documented travel since 11/01/22         Health Maintenance reviewed and discussed and ordered per Provider. Health Maintenance Due   Topic Date Due    Hepatitis C Screening  Never done    DTaP/Tdap/Td series (1 - Tdap) Never done    Colorectal Cancer Screening Combo  Never done    Shingrix Vaccine Age 50> (1 of 2) Never done    COVID-19 Vaccine (3 - Booster for Moderna series) 09/01/2021    Flu Vaccine (1) Never done    Depression Screen  12/09/2022   . Coordination of Care:  1. \"Have you been to the ER, urgent care clinic since your last visit? Hospitalized since your last visit? \" No    2. \"Have you seen or consulted any other health care providers outside of the [address] [place] [address] since your last visit? \" No     3. For patients aged 39-70: Has the patient had a colonoscopy? Yes - Care Gap present. Rooming MA/LPN to request most recent results     If the patient is female:    4. For patients aged 41-77: Has the patient had a mammogram within the past 2 years? Yes - no Care Gap present    5. For patients aged 21-65: Has the patient had a pap smear?  Yes - no Care Gap present

## 2022-12-01 NOTE — PROGRESS NOTES
Sami Flores (: 1960) is a 63 y.o. female patient, here for evaluation of the following chief complaint(s):  Establish Care (Establish care with provider has not seen a dr in a while and needs some overall blood work)       ASSESSMENT/PLAN:  Below is the assessment and plan developed based on review of pertinent history, physical exam, labs, studies, and medications. I spent 20 minutes with this patient performing a medically appropriate exam, ordering tests and medications, counseling and educating, and documenting in the EMR  Labs will be ordered today, mammogram ordered today, will refill medication paroxetine. We will follow-up with patient with all lab results. 1. Encounter for vitamin deficiency screening  -     VITAMIN D, 25 HYDROXY  2. MARCY (generalized anxiety disorder)  -     PARoxetine (PAXIL) 20 mg tablet; Take 1 Tablet by mouth daily. , Normal, Disp-90 Tablet, R-3  -     CBC WITH AUTOMATED DIFF  -     METABOLIC PANEL, COMPREHENSIVE  -     LIPID PANEL  3. Allergic gastroenteritis  -     PARoxetine (PAXIL) 20 mg tablet; Take 1 Tablet by mouth daily. , Normal, Disp-90 Tablet, R-3  4. Hot flashes due to menopause  -     PARoxetine (PAXIL) 20 mg tablet; Take 1 Tablet by mouth daily. , Normal, Disp-90 Tablet, R-3  5. Encounter for screening mammogram for malignant neoplasm of breast  -     [facility] 3D LONA W MAMMO BI SCREENING INCL CAD; Future  6. Fatigue, unspecified type  -     TSH 3RD GENERATION  No results found for this visit on 22. Return if symptoms worsen or fail to improve. I have discussed the diagnosis with the patient and the intended plan as seen in the above orders. The patient has received an after-visit summary and questions were answered concerning future plans. I have discussed medication side effects and warnings with the patient as well. I have reviewed the plan of care with the patient, accepted their input and they are in agreement with the treatment goals. Previous lab and imaging results were reviewed by me. SUBJECTIVE/OBJECTIVE:    HPI: 27-year-old female presents to the clinic for refill of her medication. She has been taking the medication paroxetine for a couple of years, and she states it does seem to help with anxiety. She states she did try to reduce the dose to 10 mg and developed hot flashes so she continues to take it at 20 mg and has no hot flashes. The patient states she had a colonoscopy a few months ago and a mass was removed from the rectal area but she was told this was negative for malignancy. She has follow-up scheduled in a few months. Patient sees a gastroenterologist in [place]. The patient states she chronically has 8-10 bowel movements a day although they could be quite small. She states she does take fiber, calcium, vitamin D3, and she takes a medication called prelieve acid reducer. States she was diagnosed with cervical cancer approximately at 30years of age, she has had a total hysterectomy with radiation. The patient states she does get mammograms yearly it is almost time for her to have another mammogram she denies any history of breast cancer    Denies any illness today denying any acute symptoms such as fever, chills, sweats, abdominal pain, nausea, vomiting. ROS:  Constitutional: Negative for fever, chills ,fatigue, unexplained weight gain/loss  HENT:  Negative for ear pain,postnasal drip, rhinitis,  Eyes:  Negative for visual disturbance,   Respiratory:  Negative for cough and shortness of breath,wheezing ,congestion  Cardiovascular:  Negative for chest pain, palpitations and leg swelling. Gastrointestinal: Chronic radiation-induced diarrhea   musculoskeletal:  Negative for arthralgias, back pain and gait problem.  Joint pain, muscle pain  Skin:  Negative for rash, lesions,  Neurological:  Negative for fainting,dizziness, weakness, headaches,numbness       Current Outpatient Medications   Medication Sig PARoxetine (PAXIL) 20 mg tablet Take 1 Tablet by mouth daily. No current facility-administered medications for this visit. /83   Pulse 74   Temp 97.4 °F (36.3 °C)   Resp 16   Wt 170 lb 6.4 oz (77.3 kg)   SpO2 95%   BMI 25.91 kg/m²            General:  alert, cooperative, well appearing, in no apparent distress. Eyes:  Pupils are equally round and reactive to light with accommodation. ENT:   The tongue and mucous membranes are pink and moist without lesions. The dentition is generally in good health. The pharynx is non-erythematous without exudates. Neck:  There is normal range of motion. The thyroid is normal size without nodules or masses. There is no lymphadenopathy. CV:  The heart sounds are regular in rate and rhythm. There is a normal S1 and S2. There or no murmurs, rubs, or gallops. Distal pulses are intact and equal.  Lungs: Inspiratory and expiratory efforts are full and unlabored. Lung sounds are clear and equal to auscultation throughout all lung fields without wheezing, rales, or rhonchi. Extremities: There is no clubbing, cyanosis, or edema. Bilateral upper extremities 3+ peripheral pulses. cap refill less than 2 seconds   Neurological:   There is no obvious focal sensory or motor deficits. An electronic signature was used to authenticate this note.   -- DOMINGO Mcdonough

## 2022-12-02 ENCOUNTER — HOSPITAL ENCOUNTER (OUTPATIENT)
Dept: LAB | Age: 62
End: 2022-12-02
Payer: COMMERCIAL

## 2022-12-02 LAB
ALBUMIN SERPL-MCNC: 3.6 G/DL (ref 3.4–5)
ALBUMIN/GLOB SERPL: 1.2 {RATIO} (ref 0.8–1.7)
ALP SERPL-CCNC: 70 U/L (ref 45–117)
ALT SERPL-CCNC: 26 U/L (ref 13–56)
ANION GAP SERPL CALC-SCNC: 8 MMOL/L (ref 3–18)
AST SERPL W P-5'-P-CCNC: 16 U/L (ref 10–38)
BASOPHILS # BLD: 0 K/UL (ref 0–0.1)
BASOPHILS NFR BLD: 1 % (ref 0–2)
BILIRUB SERPL-MCNC: 0.4 MG/DL (ref 0.2–1)
BUN SERPL-MCNC: 17 MG/DL (ref 7–18)
BUN/CREAT SERPL: 19 (ref 12–20)
CA-I BLD-MCNC: 9.1 MG/DL (ref 8.5–10.1)
CHLORIDE SERPL-SCNC: 105 MMOL/L (ref 100–111)
CO2 SERPL-SCNC: 27 MMOL/L (ref 21–32)
CREAT SERPL-MCNC: 0.91 MG/DL (ref 0.6–1.3)
DIFFERENTIAL METHOD BLD: ABNORMAL
EOSINOPHIL # BLD: 0.2 K/UL (ref 0–0.4)
EOSINOPHIL NFR BLD: 4 % (ref 0–5)
ERYTHROCYTE [DISTWIDTH] IN BLOOD BY AUTOMATED COUNT: 12.4 % (ref 11.6–14.5)
GLOBULIN SER CALC-MCNC: 3.1 G/DL (ref 2–4)
GLUCOSE SERPL-MCNC: 113 MG/DL (ref 74–99)
HCT VFR BLD AUTO: 43.8 % (ref 35–45)
HGB BLD-MCNC: 14 G/DL (ref 12–16)
IMM GRANULOCYTES # BLD AUTO: 0 K/UL (ref 0–0.04)
IMM GRANULOCYTES NFR BLD AUTO: 1 % (ref 0–0.5)
LYMPHOCYTES # BLD: 1.5 K/UL (ref 0.9–3.6)
LYMPHOCYTES NFR BLD: 24 % (ref 21–52)
MCH RBC QN AUTO: 30.2 PG (ref 24–34)
MCHC RBC AUTO-ENTMCNC: 32 G/DL (ref 31–37)
MCV RBC AUTO: 94.6 FL (ref 78–100)
MONOCYTES # BLD: 0.4 K/UL (ref 0.05–1.2)
MONOCYTES NFR BLD: 6 % (ref 3–10)
NEUTS SEG # BLD: 4.2 K/UL (ref 1.8–8)
NEUTS SEG NFR BLD: 64 % (ref 40–73)
NRBC # BLD: 0 K/UL (ref 0–0.01)
NRBC BLD-RTO: 0 PER 100 WBC
PLATELET # BLD AUTO: 258 K/UL (ref 135–420)
PMV BLD AUTO: 10.2 FL (ref 9.2–11.8)
POTASSIUM SERPL-SCNC: 4.1 MMOL/L (ref 3.5–5.5)
PROT SERPL-MCNC: 6.7 G/DL (ref 6.4–8.2)
RBC # BLD AUTO: 4.63 M/UL (ref 4.2–5.3)
SODIUM SERPL-SCNC: 140 MMOL/L (ref 136–145)
TSH SERPL DL<=0.05 MIU/L-ACNC: 1.81 UIU/ML (ref 0.36–3.74)
WBC # BLD AUTO: 6.3 K/UL (ref 4.6–13.2)

## 2022-12-02 PROCEDURE — 36415 COLL VENOUS BLD VENIPUNCTURE: CPT

## 2022-12-02 PROCEDURE — 82306 VITAMIN D 25 HYDROXY: CPT

## 2022-12-02 PROCEDURE — 84443 ASSAY THYROID STIM HORMONE: CPT

## 2022-12-02 PROCEDURE — 80061 LIPID PANEL: CPT

## 2022-12-02 PROCEDURE — 80053 COMPREHEN METABOLIC PANEL: CPT

## 2022-12-02 PROCEDURE — 85025 COMPLETE CBC W/AUTO DIFF WBC: CPT

## 2022-12-03 LAB
25(OH)D3 SERPL-MCNC: 38.3 NG/ML (ref 30–100)
CHOLEST SERPL-MCNC: 214 MG/DL
HDLC SERPL-MCNC: 93 MG/DL (ref 40–60)
HDLC SERPL: 2.3 {RATIO} (ref 0–5)
LDLC SERPL CALC-MCNC: 75.6 MG/DL (ref 0–100)
LIPID PROFILE,FLP: ABNORMAL
TRIGL SERPL-MCNC: 227 MG/DL (ref ?–150)
VLDLC SERPL CALC-MCNC: 45.4 MG/DL

## 2022-12-05 NOTE — PROGRESS NOTES
Please let the patient about her lab work. Vitamin D level is stable and within normal limits. Cholesterol total is elevated but LDL is good.   All other labs are stable

## 2023-02-09 DIAGNOSIS — Z12.31 ENCOUNTER FOR SCREENING MAMMOGRAM FOR MALIGNANT NEOPLASM OF BREAST: ICD-10-CM

## 2023-02-09 NOTE — PROGRESS NOTES
Please let the patient know no evidence of malignancy found on mammogram.  Recommended 1 year follow-up for repeat mammogram

## 2023-06-07 ENCOUNTER — HOSPITAL ENCOUNTER (OUTPATIENT)
Age: 63
Setting detail: SPECIMEN
Discharge: HOME | End: 2023-06-10
Payer: COMMERCIAL

## 2023-06-07 ENCOUNTER — OFFICE VISIT (OUTPATIENT)
Facility: CLINIC | Age: 63
End: 2023-06-07
Payer: COMMERCIAL

## 2023-06-07 VITALS
SYSTOLIC BLOOD PRESSURE: 126 MMHG | DIASTOLIC BLOOD PRESSURE: 76 MMHG | HEART RATE: 61 BPM | OXYGEN SATURATION: 97 % | RESPIRATION RATE: 16 BRPM | TEMPERATURE: 97.2 F

## 2023-06-07 DIAGNOSIS — F41.1 GAD (GENERALIZED ANXIETY DISORDER): ICD-10-CM

## 2023-06-07 DIAGNOSIS — S99.921A RIGHT FOOT INJURY, INITIAL ENCOUNTER: ICD-10-CM

## 2023-06-07 DIAGNOSIS — N30.00 ACUTE CYSTITIS WITHOUT HEMATURIA: Primary | ICD-10-CM

## 2023-06-07 LAB
BILIRUBIN, URINE, POC: NEGATIVE
BLOOD URINE, POC: ABNORMAL
GLUCOSE URINE, POC: NEGATIVE
KETONES, URINE, POC: NEGATIVE
LEUKOCYTE ESTERASE, URINE, POC: ABNORMAL
NITRITE, URINE, POC: NEGATIVE
PH, URINE, POC: 5.5 (ref 4.6–8)
PROTEIN,URINE, POC: NEGATIVE
SPECIFIC GRAVITY, URINE, POC: <=1.005 (ref 1–1.03)
URINALYSIS CLARITY, POC: CLEAR
URINALYSIS COLOR, POC: YELLOW
UROBILINOGEN, POC: NORMAL

## 2023-06-07 PROCEDURE — 99214 OFFICE O/P EST MOD 30 MIN: CPT | Performed by: NURSE PRACTITIONER

## 2023-06-07 PROCEDURE — 87086 URINE CULTURE/COLONY COUNT: CPT

## 2023-06-07 PROCEDURE — 81001 URINALYSIS AUTO W/SCOPE: CPT | Performed by: NURSE PRACTITIONER

## 2023-06-07 RX ORDER — NITROFURANTOIN 25; 75 MG/1; MG/1
100 CAPSULE ORAL 2 TIMES DAILY
Qty: 10 CAPSULE | Refills: 0 | Status: SHIPPED | OUTPATIENT
Start: 2023-06-07 | End: 2023-06-12

## 2023-06-07 RX ORDER — PAROXETINE HYDROCHLORIDE 20 MG/1
20 TABLET, FILM COATED ORAL DAILY
Qty: 90 TABLET | Refills: 0 | Status: SHIPPED | OUTPATIENT
Start: 2023-06-07 | End: 2023-09-05

## 2023-06-07 SDOH — ECONOMIC STABILITY: INCOME INSECURITY: HOW HARD IS IT FOR YOU TO PAY FOR THE VERY BASICS LIKE FOOD, HOUSING, MEDICAL CARE, AND HEATING?: NOT VERY HARD

## 2023-06-07 SDOH — ECONOMIC STABILITY: FOOD INSECURITY: WITHIN THE PAST 12 MONTHS, YOU WORRIED THAT YOUR FOOD WOULD RUN OUT BEFORE YOU GOT MONEY TO BUY MORE.: NEVER TRUE

## 2023-06-07 SDOH — ECONOMIC STABILITY: HOUSING INSECURITY
IN THE LAST 12 MONTHS, WAS THERE A TIME WHEN YOU DID NOT HAVE A STEADY PLACE TO SLEEP OR SLEPT IN A SHELTER (INCLUDING NOW)?: NO

## 2023-06-07 SDOH — ECONOMIC STABILITY: FOOD INSECURITY: WITHIN THE PAST 12 MONTHS, THE FOOD YOU BOUGHT JUST DIDN'T LAST AND YOU DIDN'T HAVE MONEY TO GET MORE.: NEVER TRUE

## 2023-06-07 ASSESSMENT — PATIENT HEALTH QUESTIONNAIRE - PHQ9
SUM OF ALL RESPONSES TO PHQ QUESTIONS 1-9: 0
SUM OF ALL RESPONSES TO PHQ QUESTIONS 1-9: 0
1. LITTLE INTEREST OR PLEASURE IN DOING THINGS: 0
SUM OF ALL RESPONSES TO PHQ QUESTIONS 1-9: 0
SUM OF ALL RESPONSES TO PHQ QUESTIONS 1-9: 0
2. FEELING DOWN, DEPRESSED OR HOPELESS: 0
SUM OF ALL RESPONSES TO PHQ9 QUESTIONS 1 & 2: 0

## 2023-06-08 ENCOUNTER — HOSPITAL ENCOUNTER (OUTPATIENT)
Age: 63
Discharge: HOME | End: 2023-06-08
Payer: COMMERCIAL

## 2023-06-08 DIAGNOSIS — S99.921A RIGHT FOOT INJURY, INITIAL ENCOUNTER: ICD-10-CM

## 2023-06-08 LAB
BACTERIA SPEC CULT: ABNORMAL
COLONY COUNT, CNT: 6000
COLONY COUNT, CNT: ABNORMAL
Lab: ABNORMAL

## 2023-06-08 PROCEDURE — 73620 X-RAY EXAM OF FOOT: CPT

## 2023-06-09 NOTE — RESULT ENCOUNTER NOTE
Urine culture was not positive.   How is the patient feeling if she continues to have symptoms we should follow-up and reevaluate her

## 2023-06-12 NOTE — RESULT ENCOUNTER NOTE
Let the pt know possible fracture of right foot   Read today 6/12 and received today   Referred to podiatry

## 2023-06-13 NOTE — RESULT ENCOUNTER NOTE
Called and notified patient of her Xray results. I also advised her that we did not get her results until yesterday around 4:30 and we had received her message about not being billed for the service. I told her that we did not have anything to do with that part she would have to contact the hospital. She advised me that, that message was not supposed to come to us but to the hospital's billing department. She stated she was very well pleased with [facility] but she did not want them to read the Xray at that point it was unacceptable for them to take so long to read an Xray.

## 2023-09-07 DIAGNOSIS — F41.1 GAD (GENERALIZED ANXIETY DISORDER): ICD-10-CM

## 2023-09-07 RX ORDER — PAROXETINE HYDROCHLORIDE 20 MG/1
20 TABLET, FILM COATED ORAL DAILY
Qty: 90 TABLET | Refills: 1 | Status: SHIPPED | OUTPATIENT
Start: 2023-09-07

## 2024-03-22 DIAGNOSIS — M25.511 RIGHT SHOULDER PAIN, UNSPECIFIED CHRONICITY: Primary | ICD-10-CM

## 2024-03-25 ENCOUNTER — HOSPITAL ENCOUNTER (OUTPATIENT)
Age: 64
Discharge: HOME | End: 2024-03-28
Payer: COMMERCIAL

## 2024-03-25 ENCOUNTER — OFFICE VISIT (OUTPATIENT)
Age: 64
End: 2024-03-25
Payer: COMMERCIAL

## 2024-03-25 VITALS — HEIGHT: 68 IN | BODY MASS INDEX: 25.76 KG/M2 | WEIGHT: 170 LBS

## 2024-03-25 DIAGNOSIS — M25.511 RIGHT SHOULDER PAIN, UNSPECIFIED CHRONICITY: ICD-10-CM

## 2024-03-25 DIAGNOSIS — M75.51 BURSITIS OF RIGHT SHOULDER: ICD-10-CM

## 2024-03-25 DIAGNOSIS — M25.511 RIGHT SHOULDER PAIN, UNSPECIFIED CHRONICITY: Primary | ICD-10-CM

## 2024-03-25 PROCEDURE — 99203 OFFICE O/P NEW LOW 30 MIN: CPT | Performed by: ORTHOPAEDIC SURGERY

## 2024-03-25 PROCEDURE — 20611 DRAIN/INJ JOINT/BURSA W/US: CPT | Performed by: ORTHOPAEDIC SURGERY

## 2024-03-25 PROCEDURE — 73030 X-RAY EXAM OF SHOULDER: CPT

## 2024-03-25 RX ORDER — TRIAMCINOLONE ACETONIDE 40 MG/ML
40 INJECTION, SUSPENSION INTRA-ARTICULAR; INTRAMUSCULAR ONCE
Status: COMPLETED | OUTPATIENT
Start: 2024-03-25 | End: 2024-03-25

## 2024-03-25 RX ORDER — LIDOCAINE HYDROCHLORIDE 10 MG/ML
9 INJECTION, SOLUTION INFILTRATION; PERINEURAL ONCE
Status: COMPLETED | OUTPATIENT
Start: 2024-03-25 | End: 2024-03-25

## 2024-03-25 RX ADMIN — LIDOCAINE HYDROCHLORIDE 9 ML: 10 INJECTION, SOLUTION INFILTRATION; PERINEURAL at 14:23

## 2024-03-25 RX ADMIN — TRIAMCINOLONE ACETONIDE 40 MG: 40 INJECTION, SUSPENSION INTRA-ARTICULAR; INTRAMUSCULAR at 14:23

## 2024-03-25 NOTE — PATIENT INSTRUCTIONS
ibuprofen (Advil, Motrin) and naproxen (Aleve). Read and follow all instructions on the label.  To prevent stiffness, gently move the shoulder joint through its full range of motion. As the pain gets better, keep doing range-of-motion exercises. Ask your doctor for exercises that will make the muscles around the shoulder joint stronger. Do these as directed.  You can slowly return to the activity that caused the pain, but do it with less effort until you can do it without pain or swelling. Be sure to warm up before and stretch after you do the activity.  When should you call for help?   Call your doctor now or seek immediate medical care if:    You have a fever.     You have increased swelling or redness in your shoulder.     You cannot use your shoulder, or the pain in your shoulder gets worse.   Watch closely for changes in your health, and be sure to contact your doctor if:    You have pain for 2 weeks or longer despite home treatment.   Where can you learn more?  Go to [email or web address]://[email or web address].net/patientEd and enter M955 to learn more about \"Shoulder Bursitis: Care Instructions.\"  Current as of: March 9, 2022               Content Version: 13.5  © 4291-4765 [employer].   Care instructions adapted under license by [employer]. If you have questions about a medical condition or this instruction, always ask your healthcare professional. [employer] disclaims any warranty or liability for your use of this information.

## 2024-03-25 NOTE — PROGRESS NOTES
Office:    Follow-up and Dispositions    Return if symptoms worsen or fail to improve.        Scribed by Daylin Gomez LPN as dictated by Delbert Crespo MD.  Documentation, performed by, True and Accepted Delbert Crespo MD

## 2024-04-12 ENCOUNTER — OFFICE VISIT (OUTPATIENT)
Facility: CLINIC | Age: 64
End: 2024-04-12
Payer: COMMERCIAL

## 2024-04-12 VITALS
DIASTOLIC BLOOD PRESSURE: 75 MMHG | HEART RATE: 64 BPM | RESPIRATION RATE: 16 BRPM | OXYGEN SATURATION: 99 % | WEIGHT: 169.4 LBS | SYSTOLIC BLOOD PRESSURE: 115 MMHG | BODY MASS INDEX: 25.67 KG/M2 | HEIGHT: 68 IN | TEMPERATURE: 98.2 F

## 2024-04-12 DIAGNOSIS — Z00.00 WELL ADULT EXAM: Primary | ICD-10-CM

## 2024-04-12 DIAGNOSIS — F41.1 GAD (GENERALIZED ANXIETY DISORDER): ICD-10-CM

## 2024-04-12 PROCEDURE — 99396 PREV VISIT EST AGE 40-64: CPT | Performed by: NURSE PRACTITIONER

## 2024-04-12 ASSESSMENT — PATIENT HEALTH QUESTIONNAIRE - PHQ9
SUM OF ALL RESPONSES TO PHQ9 QUESTIONS 1 & 2: 0
SUM OF ALL RESPONSES TO PHQ QUESTIONS 1-9: 0
1. LITTLE INTEREST OR PLEASURE IN DOING THINGS: NOT AT ALL
2. FEELING DOWN, DEPRESSED OR HOPELESS: NOT AT ALL

## 2024-04-12 NOTE — PROGRESS NOTES
Chief Complaint   Patient presents with    Follow-up       \"Have you been to the ER, urgent care clinic since your last visit?  Hospitalized since your last visit?\"    NO    “Have you seen or consulted any other health care providers outside of [facility] since your last visit?”    NO            
outpatient medications on file.     No current facility-administered medications for this visit.       /75 (Site: Left Upper Arm, Position: Sitting, Cuff Size: Small Adult)   Pulse 64   Temp 98.2 °F (36.8 °C) (Oral)   Resp 16   Ht 1.727 m (5' 8\")   Wt 76.8 kg (169 lb 6.4 oz)   SpO2 99%   BMI 25.76 kg/m²            General:  alert, cooperative, well appearing, in no apparent distress.  Eyes:  Pupils are equally round and reactive to light with accommodation.  The extra-ocular movements are intact.  The lids are without swelling, lesions, or drainage.  The conjunctiva is clear and noninjected.  ENT:    The tongue and mucous membranes are pink and moist without lesions.  The pharynx is non-erythematous without exudates.  Neck:  There is normal range of motion.  The thyroid is normal size without nodules or masses.  There is no lymphadenopathy.  CV:  The heart sounds are regular in rate and rhythm.  There is a normal S1 and S2.  There or no murmurs, rubs, or gallops.  Distal pulses are intact and equal.  Lungs:  Inspiratory and expiratory efforts are full and unlabored.  Lung sounds are clear and equal to auscultation throughout all lung fields without wheezing, rales, or rhonchi.  GI:  The abdomen is soft and nontender.  Bowel sounds are present in all 4 quadrants.  There is no hepatosplenomegaly or other masses.  There is no rebound or guarding.  There is no CVA or suprapubic tenderness..  Extremities:  There is no clubbing, cyanosis, or edema.  3+ peripheral pulses.cap refill less than 2 seconds   Neurological:    There is no obvious focal sensory or motor deficits.   Strength is 5/5 in the upper and lower extremity bilaterally.          This chart was prepared using voice recognition software and may contain unintended word substitution errors    An electronic signature was used to authenticate this note.  -- IMER Rodriguez

## 2024-04-13 PROBLEM — K52.29 ALLERGIC GASTROENTERITIS: Status: RESOLVED | Noted: 2020-11-12 | Resolved: 2024-04-13

## 2024-04-13 PROBLEM — F41.1 GAD (GENERALIZED ANXIETY DISORDER): Status: RESOLVED | Noted: 2020-11-12 | Resolved: 2024-04-13

## 2024-04-18 LAB — HBA1C MFR BLD HPLC: 5 %

## 2024-04-19 DIAGNOSIS — Z00.00 WELL ADULT EXAM: ICD-10-CM

## 2024-04-21 NOTE — RESULT ENCOUNTER NOTE
Patient's lab work is stable with some mild abnormals  If the patient would like to review in detail we can set up a telephone visit

## 2024-05-03 ENCOUNTER — TELEPHONE (OUTPATIENT)
Facility: CLINIC | Age: 64
End: 2024-05-03

## 2024-08-06 ENCOUNTER — COMMUNITY OUTREACH (OUTPATIENT)
Facility: CLINIC | Age: 64
End: 2024-08-06

## 2024-10-02 ENCOUNTER — COMMUNITY OUTREACH (OUTPATIENT)
Facility: CLINIC | Age: 64
End: 2024-10-02

## 2024-10-02 NOTE — PROGRESS NOTES
Patient's HM shows they are overdue for Colorectal Screening.   Care Everywhere and  files searched.  No results to attach to order nor HM updated.      HM updated with most recent CT Colonography, not due.

## 2025-03-17 ENCOUNTER — COMMUNITY OUTREACH (OUTPATIENT)
Facility: CLINIC | Age: 65
End: 2025-03-17

## 2025-03-17 NOTE — PROGRESS NOTES
Patient's HM shows they are overdue for Colorectal Screening.   Care Everywhere and  files searched.   up to date with most recent Colonography.